# Patient Record
Sex: MALE | NOT HISPANIC OR LATINO | Employment: FULL TIME | URBAN - METROPOLITAN AREA
[De-identification: names, ages, dates, MRNs, and addresses within clinical notes are randomized per-mention and may not be internally consistent; named-entity substitution may affect disease eponyms.]

---

## 2024-01-24 ENCOUNTER — OFFICE VISIT (OUTPATIENT)
Dept: OPTOMETRY | Facility: CLINIC | Age: 24
End: 2024-01-24
Payer: COMMERCIAL

## 2024-01-24 DIAGNOSIS — H52.11 MYOPIA, RIGHT: ICD-10-CM

## 2024-01-24 DIAGNOSIS — H52.02 HYPERMETROPIA, LEFT: ICD-10-CM

## 2024-01-24 DIAGNOSIS — H53.002 AMBLYOPIA OF LEFT EYE: ICD-10-CM

## 2024-01-24 DIAGNOSIS — H52.222 REGULAR ASTIGMATISM OF LEFT EYE: ICD-10-CM

## 2024-01-24 DIAGNOSIS — Z01.01 ENCOUNTER FOR EXAMINATION OF EYES AND VISION WITH ABNORMAL FINDINGS: Primary | ICD-10-CM

## 2024-01-24 PROCEDURE — 92015 DETERMINE REFRACTIVE STATE: CPT | Performed by: OPTOMETRIST

## 2024-01-24 PROCEDURE — 92004 COMPRE OPH EXAM NEW PT 1/>: CPT | Performed by: OPTOMETRIST

## 2024-01-24 ASSESSMENT — CONF VISUAL FIELD
OS_INFERIOR_NASAL_RESTRICTION: 0
OD_SUPERIOR_TEMPORAL_RESTRICTION: 0
OS_SUPERIOR_TEMPORAL_RESTRICTION: 0
OD_INFERIOR_TEMPORAL_RESTRICTION: 0
OD_SUPERIOR_NASAL_RESTRICTION: 0
OS_INFERIOR_TEMPORAL_RESTRICTION: 0
OD_NORMAL: 1
METHOD: COUNTING FINGERS
OD_INFERIOR_NASAL_RESTRICTION: 0
OS_SUPERIOR_NASAL_RESTRICTION: 0
OS_NORMAL: 1

## 2024-01-24 ASSESSMENT — REFRACTION_MANIFEST
METHOD_AUTOREFRACTION: 1
OD_CYLINDER: +0.50
OS_AXIS: 005
OS_AXIS: 010
OD_CYLINDER: SPHERE
OD_AXIS: 107
OS_SPHERE: +1.00
OD_SPHERE: -1.00
OS_CYLINDER: +2.00
OS_SPHERE: +1.00
OS_CYLINDER: +1.50
OD_SPHERE: -1.00

## 2024-01-24 ASSESSMENT — SLIT LAMP EXAM - LIDS
COMMENTS: NORMAL
COMMENTS: NORMAL

## 2024-01-24 ASSESSMENT — EXTERNAL EXAM - RIGHT EYE: OD_EXAM: NORMAL

## 2024-01-24 ASSESSMENT — VISUAL ACUITY
METHOD_MR: OPPOSITE SIGNS
OD_SC: 20/30
OS_SC: 20/120+1
OD_SC: 20/20-1
OD_SC+: -1
OS_SC: 20/125
METHOD: SNELLEN - LINEAR
OS_SC+: +1

## 2024-01-24 ASSESSMENT — CUP TO DISC RATIO
OD_RATIO: 0.3
OS_RATIO: 0.2

## 2024-01-24 ASSESSMENT — KERATOMETRY
OS_AXISANGLE2_DEGREES: 102
OD_K2POWER_DIOPTERS: 41.00
OS_K1POWER_DIOPTERS: 41.00
OS_AXISANGLE_DEGREES: 012
OD_K1POWER_DIOPTERS: 40.25
OS_K2POWER_DIOPTERS: 42.00
OD_AXISANGLE_DEGREES: 093
OD_AXISANGLE2_DEGREES: 003

## 2024-01-24 ASSESSMENT — TONOMETRY
IOP_METHOD: APPLANATION
OS_IOP_MMHG: 16
OD_IOP_MMHG: 15

## 2024-01-24 ASSESSMENT — EXTERNAL EXAM - LEFT EYE: OS_EXAM: NORMAL

## 2024-01-24 NOTE — PROGRESS NOTES
Chief Complaint   Patient presents with    Annual Eye Exam      Accompanied by sister Shannan and phone  Gabby(?)    Last Eye Exam: Never had one   Dilated Previously: No, side effects of dilation explained today    What are you currently using to see?  does not use glasses or contacts       Distance Vision Acuity: Noticed gradual change in both eyes     Near Vision Acuity: Satisfied with vision while reading and using computer unaided    Eye Comfort: good  Do you use eye drops? : No  Occupation or Hobbies: [employer], also in school     [employer]  Optometry Assistant        Medical, surgical and family histories reviewed and updated 1/24/2024.       OBJECTIVE: See Ophthalmology exam    ASSESSMENT:    ICD-10-CM    1. Encounter for examination of eyes and vision with abnormal findings  Z01.01       2. Amblyopia of left eye  H53.002       3. Myopia, right  H52.11       4. Hypermetropia, left  H52.02       5. Regular astigmatism of left eye  H52.222           PLAN:     Patient Instructions   Updated glasses prescription provided today.   Allow 2 weeks to adapt to the new glasses.   I recommend wearing the glasses full-time.     Return in 1 year for a comprehensive eye exam, or sooner if needed.      The effects of the dilating drops last for 4- 6 hours.  You will be more sensitive to light and vision will be blurry up close.  Mydriatic sunglasses were given if needed.     Malcolm Mathew, OD  [facility]  [address] [place] [address]. [place]  [id number]    [phone]

## 2024-01-24 NOTE — PATIENT INSTRUCTIONS
Updated glasses prescription provided today.   Allow 2 weeks to adapt to the new glasses.   I recommend wearing the glasses full-time.     Return in 1 year for a comprehensive eye exam, or sooner if needed.      The effects of the dilating drops last for 4- 6 hours.  You will be more sensitive to light and vision will be blurry up close.  Mydriatic sunglasses were given if needed.     Malcolm Mathew, JAYE  [address]  [place]  [id number]    [phone]